# Patient Record
Sex: MALE
[De-identification: names, ages, dates, MRNs, and addresses within clinical notes are randomized per-mention and may not be internally consistent; named-entity substitution may affect disease eponyms.]

---

## 2020-07-15 ENCOUNTER — APPOINTMENT (OUTPATIENT)
Dept: OPHTHALMOLOGY | Facility: CLINIC | Age: 25
End: 2020-07-15

## 2020-08-07 ENCOUNTER — APPOINTMENT (OUTPATIENT)
Dept: OPHTHALMOLOGY | Facility: CLINIC | Age: 25
End: 2020-08-07
Payer: MEDICAID

## 2020-08-07 ENCOUNTER — NON-APPOINTMENT (OUTPATIENT)
Age: 25
End: 2020-08-07

## 2020-08-07 PROCEDURE — 92015 DETERMINE REFRACTIVE STATE: CPT

## 2020-08-07 PROCEDURE — 92004 COMPRE OPH EXAM NEW PT 1/>: CPT

## 2022-05-22 ENCOUNTER — NON-APPOINTMENT (OUTPATIENT)
Age: 27
End: 2022-05-22

## 2023-01-26 PROBLEM — Z00.00 ENCOUNTER FOR PREVENTIVE HEALTH EXAMINATION: Status: ACTIVE | Noted: 2023-01-26

## 2024-02-13 ENCOUNTER — NON-APPOINTMENT (OUTPATIENT)
Age: 29
End: 2024-02-13

## 2024-02-22 ENCOUNTER — APPOINTMENT (OUTPATIENT)
Dept: ORTHOPEDIC SURGERY | Facility: CLINIC | Age: 29
End: 2024-02-22
Payer: MEDICAID

## 2024-02-22 VITALS — WEIGHT: 165 LBS | BODY MASS INDEX: 27.49 KG/M2 | HEIGHT: 65 IN

## 2024-02-22 PROCEDURE — 99204 OFFICE O/P NEW MOD 45 MIN: CPT

## 2024-02-22 RX ORDER — ESCITALOPRAM OXALATE 10 MG/1
10 TABLET, FILM COATED ORAL
Refills: 0 | Status: ACTIVE | COMMUNITY

## 2024-03-07 ENCOUNTER — APPOINTMENT (OUTPATIENT)
Dept: ORTHOPEDIC SURGERY | Facility: CLINIC | Age: 29
End: 2024-03-07
Payer: MEDICAID

## 2024-03-07 DIAGNOSIS — S52.122A DISPLACED FRACTURE OF HEAD OF LEFT RADIUS, INITIAL ENCOUNTER FOR CLOSED FRACTURE: ICD-10-CM

## 2024-03-07 PROCEDURE — 99213 OFFICE O/P EST LOW 20 MIN: CPT | Mod: 25

## 2024-03-07 PROCEDURE — 73080 X-RAY EXAM OF ELBOW: CPT | Mod: LT

## 2024-03-07 NOTE — DISCUSSION/SUMMARY
[de-identified] : 28-year-old gentleman with a nondisplaced left radial head fracture, approximately 3 weeks out, treated nonoperatively.   -X-ray and physical exam findings were discussed with the patient -NWYANN left UE -Work on ROM of the elbow to prevent stiffness -Follow-up in 4 weeks with x-rays of the left elbow at that time.  -All of the patient's questions and concerns were addressed.

## 2024-03-07 NOTE — PHYSICAL EXAM
[de-identified] : The patient is sitting comfortably in the exam room.  LEFT arm. -Skin is intact, no swelling, no ecchymosis -No tenderness to palpation medially or laterally -No pain with palpation over the radial head with range of motion -Range of motion elbow 0-120 -Pronation 90, supination 90 -Stable to varus and valgus stress -Able to make a fist -Sensation is intact median, radial, ulnar, axillary nerves -Motor is intact median, radial, ulnar, axillary nerves -Hand is warm and well-perfused, Palpable radial and ulnar pulses  [de-identified] :  X-rays of the left elbow were taken in the office today, 3/7/24. AP, Oblique and Lateral x-rays show good overall alignment of the elbow.  No displacement of the radial neck fracture.  The radiocapitellar joint is well aligned.

## 2024-03-07 NOTE — HISTORY OF PRESENT ILLNESS
[de-identified] : Mr. KALPESH ELIZABETH is a 28 year old RHD gentleman presenting for follow up evaluation of a nondisplaced left radial head fracture sustained on 2/14/24, being treated nonoperatively. Since his last visit he states he is feeling

## 2024-04-02 NOTE — HISTORY OF PRESENT ILLNESS
[de-identified] : Mr. KALPESH ELIZABETH is a 29 year old RHD gentleman presenting for follow up evaluation of a nondisplaced left radial head fracture sustained on 2/14/24, being treated nonoperatively. Since his last visit he states he is feeling

## 2024-04-02 NOTE — DISCUSSION/SUMMARY
[de-identified] : 29-year-old gentleman with a nondisplaced left radial head fracture, approximately 7 weeks out, treated nonoperatively.   -X-ray and physical exam findings were discussed with the patient -5lb weight limit left UE -Work on ROM of the elbow to prevent stiffness -Follow-up in 2 months with x-rays of the left elbow at that time.  -All of the patient's questions and concerns were addressed.

## 2024-04-02 NOTE — PHYSICAL EXAM
[de-identified] : The patient is sitting comfortably in the exam room.  LEFT arm. -Skin is intact, no swelling, no ecchymosis -No tenderness to palpation medially or laterally -No pain with palpation over the radial head with range of motion -Range of motion elbow 0-120 -Pronation 90, supination 90 -Stable to varus and valgus stress -Able to make a fist -Sensation is intact median, radial, ulnar, axillary nerves -Motor is intact median, radial, ulnar, axillary nerves -Hand is warm and well-perfused, Palpable radial and ulnar pulses  [de-identified] :  X-rays of the left elbow were taken in the office today, 4/4/24. AP, Oblique and Lateral x-rays show good overall alignment of the elbow.  No displacement of the radial neck fracture.  The radiocapitellar joint is well aligned.

## 2024-04-04 ENCOUNTER — APPOINTMENT (OUTPATIENT)
Dept: ORTHOPEDIC SURGERY | Facility: CLINIC | Age: 29
End: 2024-04-04

## 2024-04-08 NOTE — HISTORY OF PRESENT ILLNESS
[de-identified] : Mr. KALPESH ELIZABETH is a 28 year old RHD gentleman presenting for initial evaluation of a left elbow injury sustained on 2/14/24 after a fall onto an outstretched arm. He was seen at a [facility] urgent care where x-rays were performed. He presents today in a sling

## 2024-04-08 NOTE — DISCUSSION/SUMMARY
[de-identified] : 28-year-old gentleman with a nondisplaced left radial head fracture, approximately 8 days out.  -The risks and benefits of operative versus nonoperative management were discussed at length with the patient. The patient shows a good understanding of the injury and treatment options. They would like to move forward with nonoperative management  -NWB left UE -Work on ROM of the elbow to prevent stiffness -Follow-up in 2 weeks with x-rays of the left elbow at that time.  -All of the patient's questions and concerns were addressed.

## 2024-04-08 NOTE — PHYSICAL EXAM
[de-identified] : The patient is sitting comfortably in the exam room.  LEFT arm. -Skin is intact, no swelling, no ecchymosis -No tenderness to palpation medially or laterally -No pain with palpation over the radial head with range of motion -Range of motion elbow5-100 -Pronation 90, supination 90 -Stable to varus and valgus stress -Able to make a fist -Sensation is intact median, radial, ulnar, axillary nerves -Motor is intact median, radial, ulnar, axillary nerves -Hand is warm and well-perfused, Palpable radial and ulnar pulses  [de-identified] : Procedure was performed at the [facility]  EXAM: ELBOW LEFT  PROCEDURE DATE: 02/14/2024  INTERPRETATION: CLINICAL INDICATION: Elbow pain  EXAM: 4 views of the left elbow  COMPARISON: None  IMPRESSION: Subtle cortical offset of the radial neck, consistent with a nondisplaced fracture. Oblique lateral limits assessment for elbow effusion. No dislocation.  --- End of Report ---  BIRD GRIMES MD; Attending Radiologist This document has been electronically signed. Feb 14 2024 6:36PM